# Patient Record
Sex: MALE | Race: WHITE | NOT HISPANIC OR LATINO
[De-identification: names, ages, dates, MRNs, and addresses within clinical notes are randomized per-mention and may not be internally consistent; named-entity substitution may affect disease eponyms.]

---

## 2018-11-08 ENCOUNTER — APPOINTMENT (OUTPATIENT)
Dept: GASTROENTEROLOGY | Facility: CLINIC | Age: 68
End: 2018-11-08
Payer: MEDICARE

## 2018-11-08 VITALS
DIASTOLIC BLOOD PRESSURE: 78 MMHG | SYSTOLIC BLOOD PRESSURE: 148 MMHG | HEART RATE: 59 BPM | BODY MASS INDEX: 28.23 KG/M2 | OXYGEN SATURATION: 98 % | WEIGHT: 220 LBS | HEIGHT: 74 IN

## 2018-11-08 DIAGNOSIS — D12.6 BENIGN NEOPLASM OF COLON, UNSPECIFIED: ICD-10-CM

## 2018-11-08 DIAGNOSIS — Z12.11 ENCOUNTER FOR SCREENING FOR MALIGNANT NEOPLASM OF COLON: ICD-10-CM

## 2018-11-08 PROCEDURE — 99214 OFFICE O/P EST MOD 30 MIN: CPT

## 2018-11-25 ENCOUNTER — TRANSCRIPTION ENCOUNTER (OUTPATIENT)
Age: 68
End: 2018-11-25

## 2018-11-26 ENCOUNTER — RESULT REVIEW (OUTPATIENT)
Age: 68
End: 2018-11-26

## 2018-11-26 ENCOUNTER — OUTPATIENT (OUTPATIENT)
Dept: OUTPATIENT SERVICES | Facility: HOSPITAL | Age: 68
LOS: 1 days | End: 2018-11-26
Payer: MEDICARE

## 2018-11-26 ENCOUNTER — APPOINTMENT (OUTPATIENT)
Dept: GASTROENTEROLOGY | Facility: HOSPITAL | Age: 68
End: 2018-11-26

## 2018-11-26 DIAGNOSIS — Z12.11 ENCOUNTER FOR SCREENING FOR MALIGNANT NEOPLASM OF COLON: ICD-10-CM

## 2018-11-26 PROCEDURE — 88305 TISSUE EXAM BY PATHOLOGIST: CPT

## 2018-11-26 PROCEDURE — 45380 COLONOSCOPY AND BIOPSY: CPT | Mod: PT

## 2018-11-26 PROCEDURE — 45380 COLONOSCOPY AND BIOPSY: CPT

## 2018-11-26 PROCEDURE — 88305 TISSUE EXAM BY PATHOLOGIST: CPT | Mod: 26

## 2018-11-27 LAB — SURGICAL PATHOLOGY FINAL REPORT - CH: SIGNIFICANT CHANGE UP

## 2020-09-17 ENCOUNTER — APPOINTMENT (OUTPATIENT)
Dept: GASTROENTEROLOGY | Facility: CLINIC | Age: 70
End: 2020-09-17
Payer: MEDICARE

## 2020-09-17 VITALS
BODY MASS INDEX: 28.23 KG/M2 | WEIGHT: 220 LBS | OXYGEN SATURATION: 97 % | HEIGHT: 74 IN | HEART RATE: 60 BPM | TEMPERATURE: 98 F | DIASTOLIC BLOOD PRESSURE: 81 MMHG | SYSTOLIC BLOOD PRESSURE: 135 MMHG

## 2020-09-17 DIAGNOSIS — K92.1 MELENA: ICD-10-CM

## 2020-09-17 DIAGNOSIS — D50.9 IRON DEFICIENCY ANEMIA, UNSPECIFIED: ICD-10-CM

## 2020-09-17 PROCEDURE — 99214 OFFICE O/P EST MOD 30 MIN: CPT

## 2020-09-17 RX ORDER — SODIUM SULFATE, POTASSIUM SULFATE, MAGNESIUM SULFATE 17.5; 3.13; 1.6 G/ML; G/ML; G/ML
17.5-3.13-1.6 SOLUTION, CONCENTRATE ORAL
Qty: 1 | Refills: 0 | Status: DISCONTINUED | COMMUNITY
Start: 2018-11-08 | End: 2020-09-17

## 2020-09-17 RX ORDER — SODIUM SULFATE, POTASSIUM SULFATE, MAGNESIUM SULFATE 17.5; 3.13; 1.6 G/ML; G/ML; G/ML
17.5-3.13-1.6 SOLUTION, CONCENTRATE ORAL
Qty: 1 | Refills: 0 | Status: ACTIVE | COMMUNITY
Start: 2020-09-17 | End: 1900-01-01

## 2020-09-17 NOTE — HISTORY OF PRESENT ILLNESS
[de-identified] : The patient moves bowels daily. Denies any abdominal pain, constipation, diarrhea, bright red blood per rectum. Weight is stable.\par \par No heartburn, odynophagia, dysphagia or early satiety.\par \par Recently found to be iron deficient.

## 2020-09-17 NOTE — ASSESSMENT
[FreeTextEntry1] : My impression is that of a male who has a history of colon polyps now with iron deficiency and dark stools.\par \par I have asked the patient to schedule an endoscopy and colonoscopy in the near future. I have reviewed the risks benefits and alternatives and provided the patient literature to read.  I have emphasized the need to have a good clean out including adequate fluid intake and avoiding seeds for one week prior to the procedure.\par \par LSM discussed to achieve weight loss.

## 2020-09-17 NOTE — PHYSICAL EXAM
[General Appearance - Alert] : alert [General Appearance - In No Acute Distress] : in no acute distress [Sclera] : the sclera and conjunctiva were normal [PERRL With Normal Accommodation] : pupils were equal in size, round, and reactive to light [Extraocular Movements] : extraocular movements were intact [Outer Ear] : the ears and nose were normal in appearance [Oropharynx] : the oropharynx was normal [Neck Appearance] : the appearance of the neck was normal [Neck Cervical Mass (___cm)] : no neck mass was observed [Jugular Venous Distention Increased] : there was no jugular-venous distention [Thyroid Diffuse Enlargement] : the thyroid was not enlarged [Thyroid Nodule] : there were no palpable thyroid nodules [Auscultation Breath Sounds / Voice Sounds] : lungs were clear to auscultation bilaterally [Heart Rate And Rhythm] : heart rate was normal and rhythm regular [Heart Sounds] : normal S1 and S2 [Heart Sounds Gallop] : no gallops [Murmurs] : no murmurs [Heart Sounds Pericardial Friction Rub] : no pericardial rub [Edema] : there was no peripheral edema [Bowel Sounds] : normal bowel sounds [Abdomen Soft] : soft [Abdomen Tenderness] : non-tender [Abdomen Mass (___ Cm)] : no abdominal mass palpated [Cervical Lymph Nodes Enlarged Posterior Bilaterally] : posterior cervical [Cervical Lymph Nodes Enlarged Anterior Bilaterally] : anterior cervical [No CVA Tenderness] : no ~M costovertebral angle tenderness [No Spinal Tenderness] : no spinal tenderness [Skin Color & Pigmentation] : normal skin color and pigmentation [Skin Turgor] : normal skin turgor [] : no rash [No Focal Deficits] : no focal deficits [Oriented To Time, Place, And Person] : oriented to person, place, and time [Impaired Insight] : insight and judgment were intact [Affect] : the affect was normal

## 2020-09-21 DIAGNOSIS — Z01.818 ENCOUNTER FOR OTHER PREPROCEDURAL EXAMINATION: ICD-10-CM

## 2020-09-22 ENCOUNTER — APPOINTMENT (OUTPATIENT)
Dept: DISASTER EMERGENCY | Facility: CLINIC | Age: 70
End: 2020-09-22

## 2020-09-23 LAB — SARS-COV-2 N GENE NPH QL NAA+PROBE: NOT DETECTED

## 2020-09-24 ENCOUNTER — TRANSCRIPTION ENCOUNTER (OUTPATIENT)
Age: 70
End: 2020-09-24

## 2020-09-25 ENCOUNTER — APPOINTMENT (OUTPATIENT)
Dept: GASTROENTEROLOGY | Facility: HOSPITAL | Age: 70
End: 2020-09-25

## 2020-09-25 ENCOUNTER — RESULT REVIEW (OUTPATIENT)
Age: 70
End: 2020-09-25

## 2020-09-25 ENCOUNTER — OUTPATIENT (OUTPATIENT)
Dept: OUTPATIENT SERVICES | Facility: HOSPITAL | Age: 70
LOS: 1 days | End: 2020-09-25
Payer: MEDICARE

## 2020-09-25 DIAGNOSIS — D12.6 BENIGN NEOPLASM OF COLON, UNSPECIFIED: ICD-10-CM

## 2020-09-25 DIAGNOSIS — Z12.11 ENCOUNTER FOR SCREENING FOR MALIGNANT NEOPLASM OF COLON: ICD-10-CM

## 2020-09-25 PROCEDURE — 88305 TISSUE EXAM BY PATHOLOGIST: CPT

## 2020-09-25 PROCEDURE — 43239 EGD BIOPSY SINGLE/MULTIPLE: CPT

## 2020-09-25 PROCEDURE — 43239 EGD BIOPSY SINGLE/MULTIPLE: CPT | Mod: 59

## 2020-09-25 PROCEDURE — 88312 SPECIAL STAINS GROUP 1: CPT

## 2020-09-25 PROCEDURE — 88313 SPECIAL STAINS GROUP 2: CPT | Mod: 26

## 2020-09-25 PROCEDURE — 88313 SPECIAL STAINS GROUP 2: CPT

## 2020-09-25 PROCEDURE — 88305 TISSUE EXAM BY PATHOLOGIST: CPT | Mod: 26

## 2020-09-25 PROCEDURE — 88312 SPECIAL STAINS GROUP 1: CPT | Mod: 26

## 2020-09-25 PROCEDURE — G0121: CPT

## 2020-09-25 PROCEDURE — 45378 DIAGNOSTIC COLONOSCOPY: CPT

## 2020-09-28 LAB — SURGICAL PATHOLOGY STUDY: SIGNIFICANT CHANGE UP

## 2021-02-23 ENCOUNTER — EMERGENCY (EMERGENCY)
Facility: HOSPITAL | Age: 71
LOS: 0 days | Discharge: HOME | End: 2021-02-23
Attending: EMERGENCY MEDICINE
Payer: MEDICARE

## 2021-02-23 VITALS
OXYGEN SATURATION: 98 % | SYSTOLIC BLOOD PRESSURE: 151 MMHG | DIASTOLIC BLOOD PRESSURE: 79 MMHG | RESPIRATION RATE: 18 BRPM | HEART RATE: 71 BPM | TEMPERATURE: 99 F

## 2021-02-23 VITALS — WEIGHT: 220.02 LBS | HEIGHT: 73 IN

## 2021-02-23 DIAGNOSIS — F31.9 BIPOLAR DISORDER, UNSPECIFIED: ICD-10-CM

## 2021-02-23 DIAGNOSIS — M25.532 PAIN IN LEFT WRIST: ICD-10-CM
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
## 2021-02-23 DIAGNOSIS — R30.0 DYSURIA: ICD-10-CM

## 2021-02-23 DIAGNOSIS — M47.816 SPONDYLOSIS WITHOUT MYELOPATHY OR RADICULOPATHY, LUMBAR REGION: ICD-10-CM

## 2021-02-23 DIAGNOSIS — M25.552 PAIN IN LEFT HIP: ICD-10-CM

## 2021-02-23 DIAGNOSIS — Z98.84 BARIATRIC SURGERY STATUS: Chronic | ICD-10-CM

## 2021-02-23 DIAGNOSIS — F41.9 ANXIETY DISORDER, UNSPECIFIED: ICD-10-CM
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
## 2021-02-23 DIAGNOSIS — F32.9 MAJOR DEPRESSIVE DISORDER, SINGLE EPISODE, UNSPECIFIED: ICD-10-CM

## 2021-02-23 DIAGNOSIS — R07.9 CHEST PAIN, UNSPECIFIED: ICD-10-CM

## 2021-02-23 DIAGNOSIS — M79.632 PAIN IN LEFT FOREARM: ICD-10-CM

## 2021-02-23 DIAGNOSIS — S70.02XA CONTUSION OF LEFT HIP, INITIAL ENCOUNTER: ICD-10-CM

## 2021-02-23 DIAGNOSIS — R42 DIZZINESS AND GIDDINESS: ICD-10-CM

## 2021-02-23 DIAGNOSIS — Z98.84 BARIATRIC SURGERY STATUS: ICD-10-CM

## 2021-02-23 LAB
APPEARANCE UR: CLEAR — SIGNIFICANT CHANGE UP
BILIRUB UR-MCNC: NEGATIVE — SIGNIFICANT CHANGE UP
COLOR SPEC: YELLOW — SIGNIFICANT CHANGE UP
DIFF PNL FLD: ABNORMAL
GLUCOSE UR QL: NEGATIVE MG/DL — SIGNIFICANT CHANGE UP
KETONES UR-MCNC: NEGATIVE — SIGNIFICANT CHANGE UP
LEUKOCYTE ESTERASE UR-ACNC: ABNORMAL
NITRITE UR-MCNC: NEGATIVE — SIGNIFICANT CHANGE UP
PH UR: 5 — SIGNIFICANT CHANGE UP (ref 5–8)
PROT UR-MCNC: NEGATIVE MG/DL — SIGNIFICANT CHANGE UP
SP GR SPEC: 1.02 — SIGNIFICANT CHANGE UP (ref 1.01–1.02)
UROBILINOGEN FLD QL: NEGATIVE MG/DL — SIGNIFICANT CHANGE UP

## 2021-02-23 PROCEDURE — 99284 EMERGENCY DEPT VISIT MOD MDM: CPT | Mod: 25

## 2021-02-23 PROCEDURE — 73552 X-RAY EXAM OF FEMUR 2/>: CPT | Mod: LT

## 2021-02-23 PROCEDURE — 73110 X-RAY EXAM OF WRIST: CPT | Mod: 26,LT

## 2021-02-23 PROCEDURE — 93005 ELECTROCARDIOGRAM TRACING: CPT

## 2021-02-23 PROCEDURE — 73552 X-RAY EXAM OF FEMUR 2/>: CPT | Mod: 26,LT

## 2021-02-23 PROCEDURE — 73090 X-RAY EXAM OF FOREARM: CPT | Mod: 26,LT

## 2021-02-23 PROCEDURE — 81001 URINALYSIS AUTO W/SCOPE: CPT

## 2021-02-23 PROCEDURE — 71046 X-RAY EXAM CHEST 2 VIEWS: CPT | Mod: 26

## 2021-02-23 PROCEDURE — 73110 X-RAY EXAM OF WRIST: CPT | Mod: LT

## 2021-02-23 PROCEDURE — 93010 ELECTROCARDIOGRAM REPORT: CPT

## 2021-02-23 PROCEDURE — 73502 X-RAY EXAM HIP UNI 2-3 VIEWS: CPT | Mod: 26,LT

## 2021-02-23 PROCEDURE — 87086 URINE CULTURE/COLONY COUNT: CPT

## 2021-02-23 PROCEDURE — 99284 EMERGENCY DEPT VISIT MOD MDM: CPT

## 2021-02-23 PROCEDURE — 71046 X-RAY EXAM CHEST 2 VIEWS: CPT

## 2021-02-23 PROCEDURE — 73090 X-RAY EXAM OF FOREARM: CPT | Mod: LT

## 2021-02-23 PROCEDURE — 73502 X-RAY EXAM HIP UNI 2-3 VIEWS: CPT | Mod: LT

## 2021-02-23 RX ORDER — TRAMADOL HYDROCHLORIDE 50 MG/1
1 TABLET ORAL
Qty: 6 | Refills: 0
Start: 2021-02-23 | End: 2021-02-24

## 2021-02-23 NOTE — ED STATDOCS - PMH
Anemia    Anxiety    Bipolar disorder    Chronic gastritis    MDD (major depressive disorder)    Spondylolysis of lumbar region

## 2021-02-23 NOTE — ED ADULT TRIAGE NOTE - CHIEF COMPLAINT QUOTE
Pt reports that he slipped and fell this am with c/o Left hip discomfort. Tolerates ambulation. Denies LOC. Denies blood thinners. Pt also reports that he had difficulty this am with urination. Pt reports that he has been having some ongoing issues with urination prior to fall.

## 2021-02-23 NOTE — ED STATDOCS - CLINICAL SUMMARY MEDICAL DECISION MAKING FREE TEXT BOX
Pt s/p mechanical slip and fall with L hip, L wrist and L forearm pain. Plan: XRs Pt s/p mechanical slip and fall with L hip, L wrist and L forearm pain. Plan: XRs          Labs, EKG, xrays unremarkable.  Pt. has own urologist to follow with and no pain with last urination.  Pt. to follow with Dr. Zurita.  Laura Dangelo PA-C

## 2021-02-23 NOTE — ED ADULT NURSE NOTE - OBJECTIVE STATEMENT
pt presents to the ED c/o +L hip pain s/p slip and fall on ice this AM. Landed onto L hip and L wrist. After fall states urinating felt +dysuria like "rail road tracks." Has been able to ambulate. No LOC or fever. Not on AC.

## 2021-02-23 NOTE — ED STATDOCS - OBJECTIVE STATEMENT
72 y/o male with a PMHx of anemia, chronic gastritis, spondylolysis of lumbar region, MDD, bipolar disorder, anxiety, s/p bariatric surgery presents to the ED c/o +L hip pain s/p slip and fall on ice this AM. Landed onto L hip and L wrist. After fall states urinating felt +dysuria like "rail road tracks." Has been able to ambulate. No LOC or fever. Not on AC. NKDA.

## 2021-02-23 NOTE — ED STATDOCS - ATTENDING CONTRIBUTION TO CARE
I, Pee Hill MD,  performed the initial face to face bedside interview with this patient regarding history of present illness, review of symptoms and relevant past medical, social and family history.  I completed an independent physical examination.  I was the initial provider who evaluated this patient. I have signed out the follow up of any pending tests (i.e. labs, radiological studies) to the ACP.  I have communicated the patient’s plan of care and disposition with the ACP.  The history, relevant review of systems, past medical and surgical history, medical decision making, and physical examination was documented by the scribe in my presence and I attest to the accuracy of the documentation.

## 2021-02-23 NOTE — ED STATDOCS - CARE PROVIDER_API CALL
Boxer, Jonathan A  INTERNAL MEDICINE  [address] [place] [address]  Phone: [phone]  Fax: [phone]  Follow Up Time:

## 2021-02-23 NOTE — ED STATDOCS - PROGRESS NOTE DETAILS
72 y/o male with a PMHx of anemia, chronic gastritis, spondylolysis of lumbar region, MDD, bipolar disorder, anxiety, s/p bariatric surgery presents to the ED c/o +L hip pain s/p slip and fall on ice this AM. Landed onto L hip and L wrist. After fall states urinating felt +dysuria like "rail road tracks." Has been able to ambulate. No LOC or fever. Not on AC. NKDA.   Laura Dangelo PA-C Plan for xrays, UA.  Pt. ambulatory in ED.  Laura Dangelo PA-C Pt. ambulatory to bathroom with normal gait.  Xrays neg. for acute fx.  Laura Dangelo PA-C Pt. now stating dizziness and CP.  Will add EKG and CXR.  Laura Dangelo PA-C EKG unremarkable.  Laura Dangelo PA-C Labs, EKG, xrays unremarkable.  Pt. has own urologist to follow with and no pain with last urination.  Pt. to follow with Dr. Zurita.  Laura Dangelo PA-C

## 2021-02-23 NOTE — ED STATDOCS - NSFOLLOWUPINSTRUCTIONS_ED_ALL_ED_FT
Contusion in Adults    WHAT YOU NEED TO KNOW:    A contusion is a bruise that appears on your skin after an injury. A bruise happens when small blood vessels tear but skin does not. When blood vessels tear, blood leaks into nearby tissue, such as soft tissue or muscle.    DISCHARGE INSTRUCTIONS:    Return to the emergency department if:     You have new trouble moving the injured area.      You have tingling or numbness in or near the injured area.      Your hand or foot below the bruise gets cold or turns pale.     Contact your healthcare provider if:     You find a new lump in the injured area.      Your symptoms do not improve with treatment after 4 to 5 days.      You have questions or concerns about your condition or care.    Medicines: You may need any of the following:     NSAIDs help decrease swelling and pain or fever. This medicine is available with or without a doctor's order. NSAIDs can cause stomach bleeding or kidney problems in certain people. If you take blood thinner medicine, always ask your healthcare provider if NSAIDs are safe for you. Always read the medicine label and follow directions.      Prescription pain medicine may be given. Do not wait until the pain is severe before you take your medicine.      Take your medicine as directed. Contact your healthcare provider if you think your medicine is not helping or if you have side effects. Tell him of her if you are allergic to any medicine. Keep a list of the medicines, vitamins, and herbs you take. Include the amounts, and when and why you take them. Bring the list or the pill bottles to follow-up visits. Carry your medicine list with you in case of an emergency.    Follow up with your healthcare provider as directed: You may need to return within a week to check your injury again. Write down your questions so you remember to ask them during your visits.    Help a contusion heal:     Rest the injured area or use it less than usual. If you bruised your leg or foot, you may need crutches or a cane to help you walk. This will help you keep weight off your injured body part.       Apply ice to decrease swelling and pain. Ice may also help prevent tissue damage. Use an ice pack, or put crushed ice in a plastic bag. Cover it with a towel and place it on your bruise for 15 to 20 minutes every hour or as directed.      Use compression to support the area and decrease swelling. Wrap an elastic bandage around the area over the bruised muscle. Make sure the bandage is not too tight. You should be able to fit 1 finger between the bandage and your skin.      Elevate (raise) your injured body part above the level of your heart to help decrease pain and swelling. Use pillows, blankets, or rolled towels to elevate the area as often as you can.      Do not drink alcohol as directed. Alcohol may slow healing.      Do not stretch injured muscles right after your injury. Ask your healthcare provider when and how you may safely stretch after your injury. Gentle stretches can help increase your flexibility.      Do not massage the area or put heating pads on the bruise right after your injury. Heat and massage may slow healing. Your healthcare provider may tell you to apply heat after several days. At that time, heat will start to help the injury heal.    Prevent another contusion:     Stretch and warm up before you play sports or exercise.      Wear protective gear when you play sports. Examples are shin guards and padding.       If you begin a new physical activity, start slowly to give your body a chance to adjust.      Dysuria    AMBULATORY CARE:    Dysuria is trouble urinating, or pain, burning, or discomfort when you urinate. Dysuria is usually a symptom of another problem, such as a blockage or urinary tract infection.    Common symptoms include the following:   •Fever       •Cloudy, bad smelling urine       •Urge to urinate often but urinating little       •Back, side, or abdominal pain       •Blood in your urine       •Discharge that smells bad       •Itching       Seek care immediately if:   •You have severe back, side, or abdominal pain.       •You have fever and shaking chills.       •You vomit several times in a row.       Contact your healthcare provider if:   •Your symptoms do not go away, even after treatment.       •You have questions or concerns about your condition or care.       Treatment for dysuria may include medicines to treat a bacterial infection or help decrease bladder spasms.    Manage your dysuria:   •Drink more liquids. Liquids help flush out bacteria that may be causing an infection. Ask your healthcare provider how much liquid to drink each day and which liquids are best for you.       •Take sitz baths as directed. Fill a bathtub with 4 to 6 inches of warm water. You may also use a sitz bath pan that fits over a toilet. Sit in the sitz bath for 20 minutes. Do this 2 to 3 times a day, or as directed. The warm water can help decrease pain and swelling.       Follow up with your healthcare provider as directed: Write down your questions so you remember to ask them during your visits.

## 2021-02-23 NOTE — ED STATDOCS - PATIENT PORTAL LINK FT
You can access the FollowMyHealth Patient Portal offered by [facility] by registering at the following website: [email or web address]. By joining [facility]’s FollowMyHealth portal, you will also be able to view your health information using other applications (apps) compatible with our system.

## 2021-02-23 NOTE — ED STATDOCS - CARE PLAN
Principal Discharge DX:	Contusion of hip  Secondary Diagnosis:	Dysuria   Principal Discharge DX:	Contusion of hip  Secondary Diagnosis:	Dysuria  Secondary Diagnosis:	Fall, initial encounter

## 2021-02-25 LAB
CULTURE RESULTS: SIGNIFICANT CHANGE UP
SPECIMEN SOURCE: SIGNIFICANT CHANGE UP

## 2021-12-05 NOTE — ED STATDOCS - SKIN, MLM
ROS:  GENERAL: No fever, no chills  EYES: no change in vision  HEENT: +nose bleeding.  no trouble swallowing, no trouble speaking  CARDIAC: no chest pain  PULMONARY: no cough, no shortness of breath  GI: no abdominal pain, no nausea, no vomiting, no diarrhea, no constipation  : +L flank pain. No dysuria, no frequency, no change in appearance, or odor of urine  SKIN: no rashes  NEURO: no headache, no weakness  MSK: No joint pain    Michael Palomino DO
+ecchymosis L lateral buttocks

## 2022-10-07 PROBLEM — K29.50 UNSPECIFIED CHRONIC GASTRITIS WITHOUT BLEEDING: Chronic | Status: ACTIVE | Noted: 2021-02-25

## 2022-10-07 PROBLEM — M43.06 SPONDYLOLYSIS, LUMBAR REGION: Chronic | Status: ACTIVE | Noted: 2021-02-25

## 2022-10-07 PROBLEM — F41.9 ANXIETY DISORDER, UNSPECIFIED: Chronic | Status: ACTIVE | Noted: 2021-02-25

## 2022-10-07 PROBLEM — D64.9 ANEMIA, UNSPECIFIED: Chronic | Status: ACTIVE | Noted: 2021-02-25

## 2022-10-07 PROBLEM — F32.9 MAJOR DEPRESSIVE DISORDER, SINGLE EPISODE, UNSPECIFIED: Chronic | Status: ACTIVE | Noted: 2021-02-25

## 2022-10-07 PROBLEM — F31.9 BIPOLAR DISORDER, UNSPECIFIED: Chronic | Status: ACTIVE | Noted: 2021-02-25

## 2022-10-07 NOTE — H&P ADULT - PROBLEM SELECTOR PLAN 1
abnormal CT coronaries  -plan for cardiac cath for ischemic work up  - IVF  cc bolus -Consent obtained for cardiac catheterization w/ coronary angiogram and possible stent placement. Pt is competent, has capacity, and understands risks and benefits of procedure. Risks and benefits discussed. Risk discussed included, but not limited to MI, stroke, mortality, major bleeding, arrythmia, or infection. All questions answered

## 2022-10-07 NOTE — H&P ADULT - HISTORY OF PRESENT ILLNESS
71 y/o M with PMHx of Dementia, HLd, RAJ, Anxiety, ETOH abuse presented to cardiology after a fall while on vacation. Hospitalization and work up done with CT coronary suggestive of cardiac ischemia. Work up completed with cardiac stress test. Referred for cardiac cath  71 y/o M with PMHx of Dementia, HLd, RAJ, Anxiety, ETOH abuse presented to cardiology after a fall while on vacation. Hospitalization and work up done with CT coronary suggestive of cardiac ischemia. Work up completed with cardiac stress test. Arrythmia during stress test. Referred for cardiac cath

## 2022-10-07 NOTE — H&P ADULT - ASSESSMENT
73 y/o M with PMHx of Dementia, HLd, RAJ, Anxiety, ETOH abuse presented to cardiology after a fall while on vacation. Hospitalization and work up done with CT coronary suggestive of cardiac ischemia. Work up completed with cardiac stress test. Referred for cardiac cath     ASA class:  Creatinine:  GFR:  Bleeding  Risk score:  Sunny Score:  71 y/o M with PMHx of Dementia, HLd, RAJ, Anxiety, ETOH abuse presented to cardiology after a fall while on vacation. Hospitalization and work up done with CT coronary suggestive of cardiac ischemia. Work up completed with cardiac stress test. Referred for cardiac cath     ASA class:  Creatinine: 0.7  GFR:110  Bleeding  Risk score: 1.2%  Sunny Score: 1 point

## 2022-10-07 NOTE — H&P ADULT - NSICDXPASTMEDICALHX_GEN_ALL_CORE_FT
PAST MEDICAL HISTORY:  Anemia     Anxiety     Bipolar disorder     Chronic gastritis     MDD (major depressive disorder)     Spondylolysis of lumbar region

## 2022-10-07 NOTE — CHART NOTE - NSCHARTNOTEFT_GEN_A_CORE
Preop Phone Call: 10/7/22 440pm  - Able to Reach Patient:  yes wife   - Info given to:	patient having cardiac catheterization  -  and allergies confirmed: yes  - Medication Information Given: yes  - Medications To Take (specify)	Ok to take a.m. meds w/sip of water  - Medications To Hold (Specify)	vit d ; xanax PRN  - Arrival Time: 0630  - NPO after:	0000  - Understanding of Information Verbalized: yes  will have a  over the age of 18  for d/c home  - Understanding of possible overnight stay if stent is placed: yes

## 2022-10-07 NOTE — H&P ADULT - NSHPLABSRESULTS_GEN_ALL_CORE
Stress test 10/4/22- suggestive of ischemia and evidence of NSVT while exercising Stress test 10/4/22- suggestive of ischemia and evidence of NSVT while exercising  10/11/22 EKG

## 2022-10-11 ENCOUNTER — OUTPATIENT (OUTPATIENT)
Dept: OUTPATIENT SERVICES | Facility: HOSPITAL | Age: 72
LOS: 1 days | Discharge: HOME | End: 2022-10-11
Payer: MEDICARE

## 2022-10-11 VITALS
WEIGHT: 220.02 LBS | SYSTOLIC BLOOD PRESSURE: 172 MMHG | RESPIRATION RATE: 16 BRPM | OXYGEN SATURATION: 100 % | DIASTOLIC BLOOD PRESSURE: 62 MMHG | HEIGHT: 73 IN | TEMPERATURE: 98 F | HEART RATE: 56 BPM

## 2022-10-11 DIAGNOSIS — R06.02 SHORTNESS OF BREATH: ICD-10-CM

## 2022-10-11 DIAGNOSIS — R94.39 ABNORMAL RESULT OF OTHER CARDIOVASCULAR FUNCTION STUDY: ICD-10-CM

## 2022-10-11 DIAGNOSIS — Z98.84 BARIATRIC SURGERY STATUS: Chronic | ICD-10-CM

## 2022-10-11 PROCEDURE — C1894: CPT

## 2022-10-11 PROCEDURE — 80048 BASIC METABOLIC PNL TOTAL CA: CPT

## 2022-10-11 PROCEDURE — C9600: CPT | Mod: LD

## 2022-10-11 PROCEDURE — 93005 ELECTROCARDIOGRAM TRACING: CPT

## 2022-10-11 PROCEDURE — C1725: CPT

## 2022-10-11 PROCEDURE — C1874: CPT

## 2022-10-11 PROCEDURE — 86900 BLOOD TYPING SEROLOGIC ABO: CPT

## 2022-10-11 PROCEDURE — 93010 ELECTROCARDIOGRAM REPORT: CPT | Mod: 76

## 2022-10-11 PROCEDURE — 0715T: CPT

## 2022-10-11 PROCEDURE — C1761: CPT

## 2022-10-11 PROCEDURE — 85025 COMPLETE CBC W/AUTO DIFF WBC: CPT

## 2022-10-11 PROCEDURE — 86901 BLOOD TYPING SEROLOGIC RH(D): CPT

## 2022-10-11 PROCEDURE — C1887: CPT

## 2022-10-11 PROCEDURE — 86850 RBC ANTIBODY SCREEN: CPT

## 2022-10-11 PROCEDURE — C1753: CPT

## 2022-10-11 PROCEDURE — C1769: CPT

## 2022-10-11 PROCEDURE — 36415 COLL VENOUS BLD VENIPUNCTURE: CPT

## 2022-10-11 PROCEDURE — 92978 ENDOLUMINL IVUS OCT C 1ST: CPT | Mod: LD

## 2022-10-11 PROCEDURE — 93458 L HRT ARTERY/VENTRICLE ANGIO: CPT | Mod: 59

## 2022-10-11 PROCEDURE — 86803 HEPATITIS C AB TEST: CPT

## 2022-10-11 RX ORDER — SODIUM CHLORIDE 9 MG/ML
250 INJECTION INTRAMUSCULAR; INTRAVENOUS; SUBCUTANEOUS ONCE
Refills: 0 | Status: COMPLETED | OUTPATIENT
Start: 2022-10-11 | End: 2022-10-11

## 2022-10-11 RX ORDER — ALPRAZOLAM 0.25 MG
1 TABLET ORAL EVERY 6 HOURS
Refills: 0 | Status: DISCONTINUED | OUTPATIENT
Start: 2022-10-11 | End: 2022-10-11

## 2022-10-11 RX ORDER — LAMOTRIGINE 25 MG/1
300 TABLET, ORALLY DISINTEGRATING ORAL DAILY
Refills: 0 | Status: DISCONTINUED | OUTPATIENT
Start: 2022-10-11 | End: 2022-10-11

## 2022-10-11 RX ORDER — LAMOTRIGINE 25 MG/1
150 TABLET, ORALLY DISINTEGRATING ORAL ONCE
Refills: 0 | Status: DISCONTINUED | OUTPATIENT
Start: 2022-10-11 | End: 2022-10-11

## 2022-10-11 RX ORDER — CLOPIDOGREL BISULFATE 75 MG/1
1 TABLET, FILM COATED ORAL
Qty: 30 | Refills: 11
Start: 2022-10-11 | End: 2023-10-05

## 2022-10-11 RX ORDER — METOPROLOL TARTRATE 50 MG
25 TABLET ORAL ONCE
Refills: 0 | Status: COMPLETED | OUTPATIENT
Start: 2022-10-12 | End: 2022-10-12

## 2022-10-11 RX ORDER — ASPIRIN/CALCIUM CARB/MAGNESIUM 324 MG
0 TABLET ORAL
Qty: 0 | Refills: 0 | DISCHARGE

## 2022-10-11 RX ORDER — LAMOTRIGINE 25 MG/1
0 TABLET, ORALLY DISINTEGRATING ORAL
Qty: 0 | Refills: 0 | DISCHARGE

## 2022-10-11 RX ORDER — ASPIRIN/CALCIUM CARB/MAGNESIUM 324 MG
81 TABLET ORAL DAILY
Refills: 0 | Status: DISCONTINUED | OUTPATIENT
Start: 2022-10-12 | End: 2022-10-11

## 2022-10-11 RX ORDER — LAMOTRIGINE 25 MG/1
50 TABLET, ORALLY DISINTEGRATING ORAL DAILY
Refills: 0 | Status: DISCONTINUED | OUTPATIENT
Start: 2022-10-11 | End: 2022-10-11

## 2022-10-11 RX ORDER — SODIUM CHLORIDE 9 MG/ML
1000 INJECTION INTRAMUSCULAR; INTRAVENOUS; SUBCUTANEOUS
Refills: 0 | Status: DISCONTINUED | OUTPATIENT
Start: 2022-10-11 | End: 2022-10-11

## 2022-10-11 RX ORDER — ACETAMINOPHEN 500 MG
650 TABLET ORAL EVERY 6 HOURS
Refills: 0 | Status: DISCONTINUED | OUTPATIENT
Start: 2022-10-11 | End: 2022-10-11

## 2022-10-11 RX ORDER — ATORVASTATIN CALCIUM 80 MG/1
1 TABLET, FILM COATED ORAL
Qty: 30 | Refills: 3
Start: 2022-10-11 | End: 2023-02-07

## 2022-10-11 RX ORDER — ATORVASTATIN CALCIUM 80 MG/1
10 TABLET, FILM COATED ORAL ONCE
Refills: 0 | Status: DISCONTINUED | OUTPATIENT
Start: 2022-10-11 | End: 2022-10-11

## 2022-10-11 RX ORDER — CLOPIDOGREL BISULFATE 75 MG/1
75 TABLET, FILM COATED ORAL DAILY
Refills: 0 | Status: DISCONTINUED | OUTPATIENT
Start: 2022-10-12 | End: 2022-10-11

## 2022-10-11 RX ADMIN — Medication 1 MILLIGRAM(S): at 17:20

## 2022-10-11 RX ADMIN — SODIUM CHLORIDE 200 MILLILITER(S): 9 INJECTION INTRAMUSCULAR; INTRAVENOUS; SUBCUTANEOUS at 10:23

## 2022-10-11 RX ADMIN — Medication 1 MILLIGRAM(S): at 23:02

## 2022-10-11 RX ADMIN — Medication 650 MILLIGRAM(S): at 13:20

## 2022-10-11 RX ADMIN — Medication 650 MILLIGRAM(S): at 22:36

## 2022-10-11 RX ADMIN — SODIUM CHLORIDE 250 MILLILITER(S): 9 INJECTION INTRAMUSCULAR; INTRAVENOUS; SUBCUTANEOUS at 07:37

## 2022-10-11 NOTE — ASU PATIENT PROFILE, ADULT - VISION (WITH CORRECTIVE LENSES IF THE PATIENT USUALLY WEARS THEM):
readers at bedside/Partially impaired: cannot see medication labels or newsprint, but can see obstacles in path, and the surrounding layout; can count fingers at arm's length

## 2022-10-11 NOTE — CHART NOTE - NSCHARTNOTEFT_GEN_A_CORE
71 y/o M with PMHx of Dementia, HLd, RAJ, Anxiety, ETOH abuse presented to cardiology after a fall while on vacation. Hospitalization and work up done with CT coronary suggestive of cardiac ischemia. Work up completed with cardiac stress test. Arrythmia during stress test. Referred for cardiac cath  (07 Oct 2022 13:42)  s/p LHC : DELMI x 2 to Prox LAD with shockwave/IVUS. denies CP, SOB, palpitation  Right radial site procedure dsg dry and intact.  No bleeding, no hematoma. Capillary refill < 2 seconds. Palpable radial pulse.

## 2022-10-12 ENCOUNTER — TRANSCRIPTION ENCOUNTER (OUTPATIENT)
Age: 72
End: 2022-10-12

## 2022-10-12 VITALS — TEMPERATURE: 98 F

## 2022-10-12 LAB
ANION GAP SERPL CALC-SCNC: 4 MMOL/L — LOW (ref 5–17)
BASOPHILS # BLD AUTO: 0.06 K/UL — SIGNIFICANT CHANGE UP (ref 0–0.2)
BASOPHILS NFR BLD AUTO: 0.7 % — SIGNIFICANT CHANGE UP (ref 0–2)
BUN SERPL-MCNC: 9 MG/DL — SIGNIFICANT CHANGE UP (ref 7–23)
CALCIUM SERPL-MCNC: 8.7 MG/DL — SIGNIFICANT CHANGE UP (ref 8.5–10.1)
CHLORIDE SERPL-SCNC: 111 MMOL/L — HIGH (ref 96–108)
CO2 SERPL-SCNC: 27 MMOL/L — SIGNIFICANT CHANGE UP (ref 22–31)
CREAT SERPL-MCNC: 0.72 MG/DL — SIGNIFICANT CHANGE UP (ref 0.5–1.3)
EGFR: 96 ML/MIN/1.73M2 — SIGNIFICANT CHANGE UP
EOSINOPHIL # BLD AUTO: 0.14 K/UL — SIGNIFICANT CHANGE UP (ref 0–0.5)
EOSINOPHIL NFR BLD AUTO: 1.5 % — SIGNIFICANT CHANGE UP (ref 0–6)
GLUCOSE SERPL-MCNC: 98 MG/DL — SIGNIFICANT CHANGE UP (ref 70–99)
HCT VFR BLD CALC: 41.9 % — SIGNIFICANT CHANGE UP (ref 39–50)
HCV AB S/CO SERPL IA: 0.13 S/CO — SIGNIFICANT CHANGE UP (ref 0–0.99)
HCV AB SERPL-IMP: SIGNIFICANT CHANGE UP
HGB BLD-MCNC: 14.1 G/DL — SIGNIFICANT CHANGE UP (ref 13–17)
IMM GRANULOCYTES NFR BLD AUTO: 0.2 % — SIGNIFICANT CHANGE UP (ref 0–0.9)
LYMPHOCYTES # BLD AUTO: 2.14 K/UL — SIGNIFICANT CHANGE UP (ref 1–3.3)
LYMPHOCYTES # BLD AUTO: 23.6 % — SIGNIFICANT CHANGE UP (ref 13–44)
MCHC RBC-ENTMCNC: 32 PG — SIGNIFICANT CHANGE UP (ref 27–34)
MCHC RBC-ENTMCNC: 33.7 GM/DL — SIGNIFICANT CHANGE UP (ref 32–36)
MCV RBC AUTO: 95 FL — SIGNIFICANT CHANGE UP (ref 80–100)
MONOCYTES # BLD AUTO: 0.66 K/UL — SIGNIFICANT CHANGE UP (ref 0–0.9)
MONOCYTES NFR BLD AUTO: 7.3 % — SIGNIFICANT CHANGE UP (ref 2–14)
NEUTROPHILS # BLD AUTO: 6.05 K/UL — SIGNIFICANT CHANGE UP (ref 1.8–7.4)
NEUTROPHILS NFR BLD AUTO: 66.7 % — SIGNIFICANT CHANGE UP (ref 43–77)
PLATELET # BLD AUTO: 210 K/UL — SIGNIFICANT CHANGE UP (ref 150–400)
POTASSIUM SERPL-MCNC: 3.8 MMOL/L — SIGNIFICANT CHANGE UP (ref 3.5–5.3)
POTASSIUM SERPL-SCNC: 3.8 MMOL/L — SIGNIFICANT CHANGE UP (ref 3.5–5.3)
RBC # BLD: 4.41 M/UL — SIGNIFICANT CHANGE UP (ref 4.2–5.8)
RBC # FLD: 12.6 % — SIGNIFICANT CHANGE UP (ref 10.3–14.5)
SODIUM SERPL-SCNC: 142 MMOL/L — SIGNIFICANT CHANGE UP (ref 135–145)
WBC # BLD: 9.07 K/UL — SIGNIFICANT CHANGE UP (ref 3.8–10.5)
WBC # FLD AUTO: 9.07 K/UL — SIGNIFICANT CHANGE UP (ref 3.8–10.5)

## 2022-10-12 PROCEDURE — 93010 ELECTROCARDIOGRAM REPORT: CPT

## 2022-10-12 RX ORDER — ASPIRIN/CALCIUM CARB/MAGNESIUM 324 MG
1 TABLET ORAL
Qty: 0 | Refills: 0 | DISCHARGE

## 2022-10-12 RX ORDER — BUPROPION HYDROCHLORIDE 150 MG/1
1 TABLET, EXTENDED RELEASE ORAL
Qty: 0 | Refills: 0 | DISCHARGE

## 2022-10-12 RX ORDER — ALPRAZOLAM 0.25 MG
0 TABLET ORAL
Qty: 0 | Refills: 0 | DISCHARGE

## 2022-10-12 RX ORDER — LAMOTRIGINE 25 MG/1
350 TABLET, ORALLY DISINTEGRATING ORAL
Qty: 0 | Refills: 0 | DISCHARGE

## 2022-10-12 RX ORDER — ERGOCALCIFEROL 1.25 MG/1
50000 CAPSULE ORAL
Qty: 0 | Refills: 0 | DISCHARGE

## 2022-10-12 RX ORDER — BUPROPION HYDROCHLORIDE 150 MG/1
0 TABLET, EXTENDED RELEASE ORAL
Qty: 0 | Refills: 0 | DISCHARGE

## 2022-10-12 RX ORDER — METOPROLOL TARTRATE 50 MG
1 TABLET ORAL
Qty: 0 | Refills: 0 | DISCHARGE

## 2022-10-12 RX ORDER — ALPRAZOLAM 0.25 MG
1 TABLET ORAL
Qty: 0 | Refills: 0 | DISCHARGE

## 2022-10-12 RX ADMIN — LAMOTRIGINE 50 MILLIGRAM(S): 25 TABLET, ORALLY DISINTEGRATING ORAL at 09:09

## 2022-10-12 RX ADMIN — Medication 25 MILLIGRAM(S): at 09:08

## 2022-10-12 RX ADMIN — LAMOTRIGINE 300 MILLIGRAM(S): 25 TABLET, ORALLY DISINTEGRATING ORAL at 09:10

## 2022-10-12 RX ADMIN — Medication 81 MILLIGRAM(S): at 09:08

## 2022-10-12 RX ADMIN — CLOPIDOGREL BISULFATE 75 MILLIGRAM(S): 75 TABLET, FILM COATED ORAL at 09:10

## 2022-10-12 RX ADMIN — Medication 1 MILLIGRAM(S): at 07:35

## 2022-10-12 NOTE — DISCHARGE NOTE NURSING/CASE MANAGEMENT/SOCIAL WORK - PATIENT PORTAL LINK FT
You can access the FollowMyHealth Patient Portal offered by [facility] by registering at the following website: [email or web address]. By joining [employer]’s FollowMyHealth portal, you will also be able to view your health information using other applications (apps) compatible with our system.

## 2022-10-12 NOTE — DISCHARGE NOTE PROVIDER - NSDCCPCAREPLAN_GEN_ALL_CORE_FT
PRINCIPAL DISCHARGE DIAGNOSIS  Diagnosis: CAD (coronary artery disease)  Assessment and Plan of Treatment: Aspirin, Plavix, lipitor

## 2022-10-12 NOTE — DISCHARGE NOTE NURSING/CASE MANAGEMENT/SOCIAL WORK - NSTRANSFERBELONGINGSRESP_GEN_A_NUR
Abilify 10 mg oral tablet: 1 tab(s) orally once a day  aspirin 81 mg oral tablet, chewable: 1 tab(s) orally once a day  docusate sodium 10 mg/mL oral liquid: 10 milliliter(s) orally 2 times a day  Lexapro 10 mg oral tablet: 1 tab(s) orally once a day  lisinopril 10 mg oral tablet: 1 tab(s) orally once a day  oxyCODONE 15 mg oral tablet: 1 tab(s) orally every 6 hours  Xanax 1 mg oral tablet: 1 tab(s) orally once a day (at bedtime)  zolpidem 5 mg oral tablet: 1 tab(s) orally once a day (at bedtime)  
yes

## 2022-10-12 NOTE — DISCHARGE NOTE PROVIDER - NSDCCPTREATMENT_GEN_ALL_CORE_FT
PRINCIPAL PROCEDURE  Procedure: Percutaneous coronary intervention, with stent insertion  Findings and Treatment: LAD stent

## 2022-10-12 NOTE — DISCHARGE NOTE PROVIDER - HOSPITAL COURSE
73 y/o M with PMHx of Dementia, HLd, RAJ, Anxiety, ETOH abuse presented to cardiology after a fall while on vacation. Hospitalization and work up done with CT coronary suggestive of cardiac ischemia. Work up completed with cardiac stress test. Arrythmia during stress test. Referred for cardiac cath  (07 Oct 2022 13:42)    s/p LHC : DELMI x 2 to Prox LAD with shockwave/IVUS  Pt denies chest pain/SOB/palpitations overnight. Stable for discharge                        14.1   9.07  )-----------( 210      ( 12 Oct 2022 06:05 )             41.9     10-12 @ 06:05    142 | 111 | 9  /8.7 | -- | --  _______________________/  3.8 | 27 | 0.72 8                          \par     < from: Cardiac Catheterization (10.11.22 @ 08:30) >    Cardiac Arteries and Lesion Findings    LMCA: Diffuse irregularity.There is mild diffuse disease noted.    LAD: Diffuse irregularity.     Prox LAD: Proximal subsection.90% stenosis 28 mm length reduced to 0%.Pre   procedure TEENA III flow was noted. Good runoff was present.The lesion was   diagnosed as a moderate risk lesion.The lesion was tubularand heavily   calcified.The lesion showed moderate angulation and mild tortuosity.     Post Procedure TEENA III flow was present.     Treatment results:Interventional treatment was successful.     Comments:IVUS showed the vessel diameter to be 4.3 mm distally and 5.1 mm   proximally.    < end of copied text >     71 y/o M with PMHx of Dementia, HLd, RAJ, Anxiety, ETOH abuse presented to cardiology after a fall while on vacation. Hospitalization and work up done with CT coronary suggestive of cardiac ischemia. Work up completed with cardiac stress test. Arrythmia during stress test. Referred for cardiac cath  (07 Oct 2022 13:42)    s/p LHC : DELMI x 2 to Prox LAD with shockwave/IVUS  Pt denies chest pain/SOB/palpitations overnight. Stable for discharge                        14.1   9.07  )-----------( 210      ( 12 Oct 2022 06:05 )             41.9     10-12 @ 06:05    142 | 111 | 9  /8.7 | -- | --  _______________________/  3.8 | 27 | 0.72 8                          \par     < from: Cardiac Catheterization (10.11.22 @ 08:30) >    Cardiac Arteries and Lesion Findings    LMCA: Diffuse irregularity.There is mild diffuse disease noted.    LAD: Diffuse irregularity.     Prox LAD: Proximal subsection.90% stenosis 28 mm length reduced to 0%.Pre   procedure TEENA III flow was noted. Good runoff was present.The lesion was   diagnosed as a moderate risk lesion.The lesion was tubularand heavily   calcified.The lesion showed moderate angulation and mild tortuosity.     Post Procedure TEENA III flow was present.     Treatment results:Interventional treatment was successful.     Comments:IVUS showed the vessel diameter to be 4.3 mm distally and 5.1 mm   proximally.    < end of copied text >    I was present for this E&M service and discussed care with patient and NP.

## 2022-10-12 NOTE — DISCHARGE NOTE PROVIDER - NSDCMRMEDTOKEN_GEN_ALL_CORE_FT
Aspirin Enteric Coated 81 mg oral delayed release tablet: 1 tab(s) orally once a day  clopidogrel 75 mg oral tablet: 1 tab(s) orally once a day   LaMICtal: 350 milligram(s) orally once a day  Lipitor 10 mg oral tablet: 1 tab(s) orally once a day (at bedtime)   Toprol-XL 25 mg oral tablet, extended release: 1 tab(s) orally once a day  Vitamin D2: 59540 unit(s) orally  Wellbutrin SR:   Xanax 1 mg oral tablet: 1 tab(s) orally once a day, As Needed   Aspirin Enteric Coated 81 mg oral delayed release tablet: 1 tab(s) orally once a day  clopidogrel 75 mg oral tablet: 1 tab(s) orally once a day   LaMICtal: 350 milligram(s) orally once a day  Lipitor 10 mg oral tablet: 1 tab(s) orally once a day (at bedtime)   Toprol-XL 25 mg oral tablet, extended release: 1 tab(s) orally once a day  Vitamin D2: 79621 unit(s) orally  Wellbutrin 100 mg oral tablet: 1 tab(s) orally once a day  Xanax 1 mg oral tablet: 1 tab(s) orally once a day, As Needed

## 2022-10-12 NOTE — DISCHARGE NOTE NURSING/CASE MANAGEMENT/SOCIAL WORK - NSDCPEFALRISK_GEN_ALL_CORE
For information on Fall & Injury Prevention, visit: [email or web address] OR  [email or web address] OR  https://www.cdc.gov/steadi/patient.html

## 2022-10-12 NOTE — PROGRESS NOTE ADULT - SUBJECTIVE AND OBJECTIVE BOX
Nurse Practitioner Progress note:   72 year old presented for elective cardiac cath on 10/11. s/p PCI Denies chest pain, shortness of breath, dizziness or palpitations. OOB to chair     PHYSICAL EXAM:  Constitutional: NAD,  Neuro: Alert and oriented x 3 Gait steady Speech clear No focal deficits  Neck: No JVD No bruit  Respiratory: CTAB  Cardiovascular: S1 and S2, RRR,   Gastrointestinal: BS+, soft, NT/ND  Extremities: No clubbing cyanosis or edema No varicosities  Vascular: 2+ peripheral pulses  Psychiatric: Normal mood, normal affect  Musculoskeletal: 5/5 strength b/l upper and lower extremities  Right radial dsg removed.  Procedure site CDI, no bleeding, no hematoma, able to move all digits with capillary refill <2 seconds, fingers warm    T(C): 37.1 (10-12-22 @ 06:23), Max: 37.1 (10-12-22 @ 06:23)  HR: 60 (10-12-22 @ 09:00) (47 - 62)  BP: 138/48 (10-12-22 @ 09:00) (129/64 - 171/76)  RR: 18 (10-12-22 @ 09:00) (14 - 25)  SpO2: 98% (10-12-22 @ 09:00) (91% - 98%)  Tele: NSR  EKG: NSR-no ischemic changes    CBC Full  -  ( 12 Oct 2022 06:05 )  WBC Count : 9.07 K/uL  RBC Count : 4.41 M/uL  Hemoglobin : 14.1 g/dL  Hematocrit : 41.9 %  Platelet Count - Automated : 210 K/uL  Mean Cell Volume : 95.0 fl  Mean Cell Hemoglobin : 32.0 pg  Mean Cell Hemoglobin Concentration : 33.7 gm/dL  Auto Neutrophil # : 6.05 K/uL  Auto Lymphocyte # : 2.14 K/uL  Auto Monocyte # : 0.66 K/uL  Auto Eosinophil # : 0.14 K/uL  Auto Basophil # : 0.06 K/uL  Auto Neutrophil % : 66.7 %  Auto Lymphocyte % : 23.6 %  Auto Monocyte % : 7.3 %  Auto Eosinophil % : 1.5 %  Auto Basophil % : 0.7 %    10-12    142  |  111<H>  |  9   ----------------------------<  98  3.8   |  27  |  0.72    Ca    8.7      12 Oct 2022 06:05        HPI:  73 y/o M with PMHx of Dementia, HLd, RAJ, Anxiety, ETOH abuse presented to cardiology after a fall while on vacation. Hospitalization and work up done with CT coronary suggestive of cardiac ischemia. Work up completed with cardiac stress test. Arrythmia during stress test. Referred for cardiac cath  (07 Oct 2022 13:42)    s/p PCI DELMI x 2 to Prox LAD with shockwave/IVUS      ASSESSMENT/PLAN:    -continue ASA b-blocker  -Plavix 75mg daily Rx  -Begin Lipitor 10mg HS Rx  --plan discussed with patient and family  -Discussed therapeutic lifestyle changes to reduce risk factors such as following a cardiac diet, weight loss, maintaining a healthy weight, exercise, smoking cessation, medication compliance, and regular follow-up  with PCP/Cardioloigst  
Cath Lab Nurse Practitioner Note  HPI:  73 y/o M with PMHx of Dementia, HLd, RAJ, Anxiety, ETOH abuse presented to cardiology after a fall while on vacation. Hospitalization and work up done with CT coronary suggestive of cardiac ischemia. Work up completed with cardiac stress test. Arrythmia during stress test. Referred for cardiac cath  (07 Oct 2022 13:42)    s/p LHC : MYKE x 2 to Prox LAD with shockwave/IVUS  Pt denies chest pain/SOB/palpitations post cath.      Vital Signs Last 24 Hrs  T(C): 36.8 (11 Oct 2022 07:21), Max: 36.8 (11 Oct 2022 07:17)  T(F): 98.2 (11 Oct 2022 07:21), Max: 98.2 (11 Oct 2022 07:17)  HR: 52 (11 Oct 2022 10:25) (51 - 56)  BP: 171/73 (11 Oct 2022 10:25) (167/77 - 175/75)  RR: 16 (11 Oct 2022 10:25) (16 - 16)  SpO2: 98% (11 Oct 2022 10:25) (98% - 100%)    Parameters below as of 11 Oct 2022 10:40  Patient On (Oxygen Delivery Method): room air        MEDICATIONS  (STANDING):  atorvastatin 10 milliGRAM(s) Oral Once  lamoTRIgine 300 milliGRAM(s) Oral daily  lamoTRIgine 50 milliGRAM(s) Oral daily  sodium chloride 0.9%. 1000 milliLiter(s) (200 mL/Hr) IV Continuous <Continuous>      PHYSICAL EXAM  GENERAL: NAD, AAOx3  HEAD:  Atraumatic, Normocephalic  EYES: EOMI, PERRLA, conjunctiva and sclera clear  NECK: Supple, No JVD, No LAD  CHEST/LUNG: Clear to auscultation bilaterally; No wheeze  HEART: s1 s2 Regular rate and rhythm; No murmurs, rubs, or gallops  ABDOMEN: Soft, Nontender, Nondistended; Bowel sounds present X 4 quadrants   EXTREMITIES:  2+ Peripheral Pulses, No clubbing, cyanosis, or edema  SKIN: No rashes or lesions,  b/l LE not red, cool to touch,, no open skin no drainage  NEURO: nonfocal CN/motor/sensory/reflexes  Psych: normal affect and behavior, calm and cooperative   PROCEDURE SITE: RRA band removed two hours post procedure ,Site is without hematoma or bleeding. Sensation and AR intact. Distal pulses palpable 2+, capillary refill < 2 seconds. Patient denies pain, numbness, tingling, CP or SOB. Clean dry dressing applied     EKG: Post PCI 10/11/22  Sinus Jared 1st degree AV block  Rate 52      PROCEDURE RESULTS: Full report to follow     ASSESSMENT : HPI:  73 y/o M with PMHx of Dementia, HLd, RAJ, Anxiety, ETOH abuse presented to cardiology after a fall while on vacation. Hospitalization and work up done with CT coronary suggestive of cardiac ischemia. Work up completed with cardiac stress test. Arrythmia during stress test. Referred for cardiac cath  (07 Oct 2022 13:42)    PLAN:  CAD, s/p 2 myke to prox LAD   Admit to CCU/CICU  -IV hydration NS @ 200cc/hr x 4hrs   VS, lab, diet and activity per PCI post orders  -continue ASAb-blocker  -begin Plavix 75mg daily 10/12/22  -Begin Lipitor 10mg HS 10/11/22  -f/u EKG in AM, AM Labs  -plan discussed with patient, family and Dr Thornton   -Discussed therapeutic lifestyle changes to reduce risk factors such as following a cardiac diet, weight loss, maintaining a healthy weight, exercise, smoking cessation, medication compliance, and regular follow-up  with PCP/Cardioloigst  --Post cath instructions reviewed, patient verbalizes and understands instructions

## 2022-10-12 NOTE — DISCHARGE NOTE PROVIDER - CARE PROVIDER_API CALL
Michael Thornton (MD)  Cardiovascular Disease  [address] [place] [address]  Phone: [phone]  Fax: [phone]  Follow Up Time: 2 weeks

## 2022-10-13 DIAGNOSIS — I25.118 ATHEROSCLEROTIC HEART DISEASE OF NATIVE CORONARY ARTERY WITH OTHER FORMS OF ANGINA PECTORIS: ICD-10-CM

## 2022-10-13 DIAGNOSIS — R94.39 ABNORMAL RESULT OF OTHER CARDIOVASCULAR FUNCTION STUDY: ICD-10-CM

## 2022-10-13 DIAGNOSIS — R55 SYNCOPE AND COLLAPSE: ICD-10-CM

## 2022-12-13 ENCOUNTER — NON-APPOINTMENT (OUTPATIENT)
Age: 72
End: 2022-12-13

## 2022-12-13 DIAGNOSIS — M25.562 PAIN IN LEFT KNEE: ICD-10-CM
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
## 2022-12-13 DIAGNOSIS — Z86.79 PERSONAL HISTORY OF OTHER DISEASES OF THE CIRCULATORY SYSTEM: ICD-10-CM

## 2022-12-13 RX ORDER — BUPROPION HYDROCHLORIDE 100 MG/1
100 TABLET, FILM COATED ORAL DAILY
Refills: 0 | Status: ACTIVE | COMMUNITY

## 2023-02-27 ENCOUNTER — APPOINTMENT (OUTPATIENT)
Dept: ORTHOPEDIC SURGERY | Facility: CLINIC | Age: 73
End: 2023-02-27
Payer: MEDICARE

## 2023-02-27 VITALS — BODY MASS INDEX: 28.23 KG/M2 | HEIGHT: 74 IN | WEIGHT: 220 LBS

## 2023-02-27 PROCEDURE — 20610 DRAIN/INJ JOINT/BURSA W/O US: CPT | Mod: LT

## 2023-02-27 PROCEDURE — J3490M: CUSTOM | Mod: NC

## 2023-02-27 PROCEDURE — 99213 OFFICE O/P EST LOW 20 MIN: CPT | Mod: 25

## 2023-02-27 RX ORDER — LAMOTRIGINE 150 MG/1
150 TABLET ORAL DAILY
Refills: 0 | Status: DISCONTINUED | COMMUNITY
End: 2023-02-27

## 2023-03-01 NOTE — ASSESSMENT
[FreeTextEntry1] : Patient comes in today for follow-up on his left knee.  He continues have pain consistent with his osteoarthritis.  He has trouble walking distances doing stairs and occasionally at night.  I gave him a cortisone injection about a year ago which seems to have helped.  He is here for another injection.

## 2023-03-01 NOTE — IMAGING
[de-identified] : Examination left lower extremity reveals normal neurovascular exam.  Examination of the left knee reveals limited range of motion from 5 to 125 degrees with slight valgus deformity.  There is mild lateral joint line pain with no Dominik's sign instability or effusion.  There is no redness rashes or visible scars.  Examination of his left hip is normal with full painless range of motion.

## 2023-03-01 NOTE — HISTORY OF PRESENT ILLNESS
[8] : 8 [Sharp] : sharp [Intermittent] : intermittent [Massage] : massage [Walking] : walking [1] : 1 [Steroid] : Steroid [] : no [FreeTextEntry1] : Lt. Knee [FreeTextEntry5] : 72 y/o M presents for f/u eval. of Lt. knee and CSI. Last tx - CSI 12/13/2021 with good relief. [FreeTextEntry9] : Voltaren Gel [de-identified] : 12/13/2021 [de-identified] : CSI [de-identified] : 12/13/2021 [de-identified] : Lt. Knee

## 2023-07-27 ENCOUNTER — APPOINTMENT (OUTPATIENT)
Dept: ORTHOPEDIC SURGERY | Facility: CLINIC | Age: 73
End: 2023-07-27
Payer: MEDICARE

## 2023-07-27 VITALS — WEIGHT: 220 LBS | HEIGHT: 73 IN | BODY MASS INDEX: 29.16 KG/M2

## 2023-07-27 PROCEDURE — 99214 OFFICE O/P EST MOD 30 MIN: CPT | Mod: 25

## 2023-07-27 PROCEDURE — 20610 DRAIN/INJ JOINT/BURSA W/O US: CPT | Mod: 50

## 2023-07-27 PROCEDURE — 73502 X-RAY EXAM HIP UNI 2-3 VIEWS: CPT

## 2023-07-28 NOTE — ASSESSMENT
[FreeTextEntry1] : Patient comes in today for follow-up on his left knee and new injuries to bilateral hips. He reports no new trauma to the hips. He has pain to the lateral hips at the trochanters on either side. Right is worse than left. He denies any groin pain. He has pain at night when laying directly onto the hip. He denies pain with walking, getting in the car, or putting shoes on. \par He continues have pain consistent with his osteoarthritis.  He has trouble walking distances doing stairs and occasionally at night.  I gave him a cortisone injection about a year ago which seems to have helped.  He is here for another injection.\par \par Left knee exam: Neurovascularly intact. Sensation intact.  Examination of the left knee reveals limited range of motion from 5 to 125 degrees with slight valgus deformity.  There is mild lateral joint line pain with no Dominik's sign instability or effusion.  There is no redness rashes or visible scars.  Examination of his left hip is normal with full painless range of motion.\par \par Bilateral hip exam:  Neurovascularly intact. Sensation intact to right/left lower extremity. No scars, cuts or abrasions. 2+ pulses to posterior tibialis. ROM is full and painless. + abductor tenderness. - groin pain. + lateral hip tenderness. - radiculopathy. + straight leg raise. 5/5 abductor strength with soreness. - pain with forced ER/IR.\par \par Bilateral hip xrays taken today in office, 1view AP pelvis and 2 views hip WB - No hip OA noted. No fractures or loose bodies. No obvious tumors, masses, or lesions. \par \par The patient has b/l trochanteric bursitis and left knee OA. He received bilateral trochanteric cortisone injections today. He tolerated them well. He will return for a left knee CSI.

## 2023-07-28 NOTE — HISTORY OF PRESENT ILLNESS
[9] : 9 [Sharp] : sharp [Constant] : constant [Massage] : massage [] : no [FreeTextEntry1] : B/L hips & Lt. Knee [FreeTextEntry5] : 72 y/o M presents for f/u eval. of Lt. Knee and NI eval. of B/L hips. Pt reports of chronic hip pain for the past 6-8 months with no associated trauma. He notes difficulty sleeping in bed. Previous tx CSI Lt knee with good relief.  [FreeTextEntry9] : Voltaren [de-identified] : 2/27/23 [de-identified] : Dr. Humphreys [de-identified] : CSI

## 2023-07-28 NOTE — PROCEDURE
[Large Joint Injection] : Large joint injection [Bilateral] : bilaterally of the [Greater Trochanteric Bursa] : greater trochanteric bursa [Pain] : pain [X-ray evidence of Osteoarthritis on this or prior visit] : x-ray evidence of Osteoarthritis on this or prior visit [Betadine] : betadine [Ethyl Chloride sprayed topically] : ethyl chloride sprayed topically [Sterile technique used] : sterile technique used [___ cc    1%] : Lidocaine ~Vcc of 1%  [___ cc    0.25%] : Bupivacaine (Marcaine) ~Vcc of 0.25%  [___ cc    40mg] : Triamcinolone (Kenalog) ~Vcc of 40 mg  [] : Patient tolerated procedure well [Call if redness, pain or fever occur] : call if redness, pain or fever occur [Previous OTC use and PT nontherapeutic] : patient has tried OTC's including aspirin, Ibuprofen, Aleve, etc or prescription NSAIDS, and/or exercises at home and/or physical therapy without satisfactory response [Patient had decreased mobility in the joint] : patient had decreased mobility in the joint [Risks, benefits, alternatives discussed / Verbal consent obtained] : the risks benefits, and alternatives have been discussed, and verbal consent was obtained

## 2023-08-03 ENCOUNTER — APPOINTMENT (OUTPATIENT)
Dept: ORTHOPEDIC SURGERY | Facility: CLINIC | Age: 73
End: 2023-08-03
Payer: MEDICARE

## 2023-08-03 VITALS — WEIGHT: 220 LBS | HEIGHT: 73 IN | BODY MASS INDEX: 29.16 KG/M2

## 2023-08-03 DIAGNOSIS — M17.12 UNILATERAL PRIMARY OSTEOARTHRITIS, LEFT KNEE: ICD-10-CM

## 2023-08-03 DIAGNOSIS — M70.61 TROCHANTERIC BURSITIS, RIGHT HIP: ICD-10-CM

## 2023-08-03 DIAGNOSIS — M70.62 TROCHANTERIC BURSITIS, LEFT HIP: ICD-10-CM

## 2023-08-03 PROCEDURE — 20611 DRAIN/INJ JOINT/BURSA W/US: CPT | Mod: LT

## 2023-08-03 PROCEDURE — 99213 OFFICE O/P EST LOW 20 MIN: CPT | Mod: 25

## 2023-08-03 NOTE — HISTORY OF PRESENT ILLNESS
[Sharp] : sharp [Constant] : constant [Massage] : massage [8] : 8 [] : no [FreeTextEntry1] : Lt. Knee [FreeTextEntry5] : 74 y/o M presents for CSI Lt. Knee today. Pt reports pain levels have decreased slightly since last visit.  [FreeTextEntry9] : Voltaren [de-identified] : 7/27/23 [de-identified] : Dr. Humphreys

## 2023-08-03 NOTE — ASSESSMENT
[FreeTextEntry1] : The patient is here for a left knee CSI as previously discussed. His b/l hip bursal injections have worked well so far. He reports no new trauma to the hips. He has pain to the lateral hips at the trochanters on either side. Right is worse than left. He denies any groin pain. He has pain at night when laying directly onto the hip. He denies pain with walking, getting in the car, or putting shoes on.  He continues have pain consistent with his osteoarthritis.  He has trouble walking distances doing stairs and occasionally at night.  I gave him a cortisone injection about a year ago which seems to have helped.  He is here for another injection.  Left knee exam: Neurovascularly intact. Sensation intact.  Examination of the left knee reveals limited range of motion from 5 to 125 degrees with slight valgus deformity.  There is mild lateral joint line pain with no Dominik's sign instability or effusion.  There is no redness rashes or visible scars.  Examination of his left hip is normal with full painless range of motion.  The patient received a left knee CSI under ultrasound guidance. He tolerated it well. He will follow up as needed.

## 2023-11-09 ENCOUNTER — APPOINTMENT (OUTPATIENT)
Dept: ORTHOPEDIC SURGERY | Facility: CLINIC | Age: 73
End: 2023-11-09
Payer: MEDICARE

## 2023-11-09 VITALS — WEIGHT: 210 LBS | HEIGHT: 73 IN | BODY MASS INDEX: 27.83 KG/M2

## 2023-11-09 DIAGNOSIS — M25.519 PAIN IN UNSPECIFIED SHOULDER: ICD-10-CM

## 2023-11-09 PROCEDURE — 73030 X-RAY EXAM OF SHOULDER: CPT | Mod: RT

## 2023-11-09 PROCEDURE — 73010 X-RAY EXAM OF SHOULDER BLADE: CPT | Mod: RT

## 2023-11-09 PROCEDURE — 99213 OFFICE O/P EST LOW 20 MIN: CPT | Mod: 25

## 2023-11-09 PROCEDURE — 20610 DRAIN/INJ JOINT/BURSA W/O US: CPT | Mod: RT

## 2024-02-15 ENCOUNTER — APPOINTMENT (OUTPATIENT)
Dept: ORTHOPEDIC SURGERY | Facility: CLINIC | Age: 74
End: 2024-02-15
Payer: MEDICARE

## 2024-02-15 VITALS — BODY MASS INDEX: 27.83 KG/M2 | HEIGHT: 73 IN | WEIGHT: 210 LBS

## 2024-02-15 DIAGNOSIS — M75.81 OTHER SHOULDER LESIONS, RIGHT SHOULDER: ICD-10-CM

## 2024-02-15 PROCEDURE — 99213 OFFICE O/P EST LOW 20 MIN: CPT | Mod: 25

## 2024-02-15 PROCEDURE — 20610 DRAIN/INJ JOINT/BURSA W/O US: CPT | Mod: RT

## 2024-02-17 PROBLEM — M75.81 TENDINITIS OF RIGHT ROTATOR CUFF: Status: ACTIVE | Noted: 2023-11-11

## 2024-02-17 NOTE — HISTORY OF PRESENT ILLNESS
[9] : 9 [Throbbing] : throbbing [Tingling] : tingling [] : yes [Sleep] : sleep [Massage] : massage [Injection therapy] : injection therapy [Lying in bed] : lying in bed [FreeTextEntry5] : 72 y/o M presents for CSI Rt. shldr today.  [FreeTextEntry7] : down Rt. arm [FreeTextEntry9] : Voltaren

## 2024-02-17 NOTE — ASSESSMENT
[FreeTextEntry1] : The patient is here for right shoulder follow up. He had excellent relief from his previous CSI. He wishes to consider this again. He has had pain increase over the past few weeks. He reports trying to put away lawn furniture and as he went to pick a piece up, had increased pain to the anterolateral shoulder. He now has pain with reaching overhead and away from the body. He denies paresthesias. The patient has weakness with overhead activity. He has pain with ADLs involving WB.   Right shoulder exam: The patient presents with no apparent distress. Neurovascularly intact. Sensation intact to right upper extremity. No scars, rashes, or abrasions. 2+ pulses to the distal radius.Tender to palpation at anterolateral shoulder. AROM  ABD 90 ER 55 IR L1. 5-/5 RC strength. 5/5 Deltoid strength. +Neer. +Calvillo.   The patient received a right shoulder subacromial CSI. He tolerated it well. He will call for MRI if needed.

## 2024-02-17 NOTE — PROCEDURE
[Large Joint Injection] : Large joint injection [Right] : of the right [Subacromial Space] : subacromial space [Pain] : pain [Inflammation] : inflammation [Repeat series performed] : repeat series performed [Alcohol] : alcohol [Betadine] : betadine [Ethyl Chloride sprayed topically] : ethyl chloride sprayed topically [Sterile technique used] : sterile technique used [___ cc    1%] : Lidocaine ~Vcc of 1%  [___ cc    0.25%] : Bupivacaine (Marcaine) ~Vcc of 0.25%  [___ cc    40mg] : Triamcinolone (Kenalog) ~Vcc of 40 mg  [] : Patient tolerated procedure well [Call if redness, pain or fever occur] : call if redness, pain or fever occur [Previous OTC use and PT nontherapeutic] : patient has tried OTC's including aspirin, Ibuprofen, Aleve, etc or prescription NSAIDS, and/or exercises at home and/or physical therapy without satisfactory response [Patient had decreased mobility in the joint] : patient had decreased mobility in the joint [Risks, benefits, alternatives discussed / Verbal consent obtained] : the risks benefits, and alternatives have been discussed, and verbal consent was obtained

## 2025-01-21 ENCOUNTER — APPOINTMENT (OUTPATIENT)
Facility: CLINIC | Age: 75
End: 2025-01-21
Payer: MEDICARE

## 2025-01-21 DIAGNOSIS — M70.62 TROCHANTERIC BURSITIS, LEFT HIP: ICD-10-CM

## 2025-01-21 DIAGNOSIS — M17.12 UNILATERAL PRIMARY OSTEOARTHRITIS, LEFT KNEE: ICD-10-CM

## 2025-01-21 PROCEDURE — 99213 OFFICE O/P EST LOW 20 MIN: CPT | Mod: 25

## 2025-01-21 PROCEDURE — 73564 X-RAY EXAM KNEE 4 OR MORE: CPT | Mod: LT

## 2025-01-21 PROCEDURE — 73502 X-RAY EXAM HIP UNI 2-3 VIEWS: CPT

## 2025-01-21 PROCEDURE — 20610 DRAIN/INJ JOINT/BURSA W/O US: CPT | Mod: LT

## 2025-06-19 ENCOUNTER — APPOINTMENT (OUTPATIENT)
Dept: ORTHOPEDIC SURGERY | Facility: CLINIC | Age: 75
End: 2025-06-19
Payer: MEDICARE

## 2025-06-19 VITALS — BODY MASS INDEX: 26.51 KG/M2 | HEIGHT: 73 IN | WEIGHT: 200 LBS

## 2025-06-19 PROBLEM — M76.899 HAMSTRING TENDINITIS AT ORIGIN: Status: ACTIVE | Noted: 2025-06-19

## 2025-06-19 PROCEDURE — 99214 OFFICE O/P EST MOD 30 MIN: CPT | Mod: 25

## 2025-06-19 PROCEDURE — 99204 OFFICE O/P NEW MOD 45 MIN: CPT | Mod: 25

## 2025-06-19 PROCEDURE — 73503 X-RAY EXAM HIP UNI 4/> VIEWS: CPT | Mod: RT

## 2025-06-19 PROCEDURE — J3490M: CUSTOM | Mod: NC

## 2025-06-19 PROCEDURE — 20611 DRAIN/INJ JOINT/BURSA W/US: CPT | Mod: RT

## 2025-07-28 ENCOUNTER — APPOINTMENT (OUTPATIENT)
Dept: ORTHOPEDIC SURGERY | Facility: CLINIC | Age: 75
End: 2025-07-28
Payer: MEDICARE

## 2025-07-28 VITALS — BODY MASS INDEX: 26.51 KG/M2 | WEIGHT: 200 LBS | HEIGHT: 73 IN

## 2025-07-28 DIAGNOSIS — Z00.00 ENCOUNTER FOR GENERAL ADULT MEDICAL EXAMINATION W/OUT ABNORMAL FINDINGS: ICD-10-CM

## 2025-07-28 DIAGNOSIS — M65.342 TRIGGER FINGER, LEFT RING FINGER: ICD-10-CM

## 2025-07-28 DIAGNOSIS — M65.332 TRIGGER FINGER, LEFT MIDDLE FINGER: ICD-10-CM

## 2025-07-28 PROCEDURE — L3908: CPT | Mod: LT

## 2025-07-28 PROCEDURE — 20550 NJX 1 TENDON SHEATH/LIGAMENT: CPT | Mod: F3

## 2025-07-28 PROCEDURE — 99213 OFFICE O/P EST LOW 20 MIN: CPT | Mod: 25

## 2025-07-28 PROCEDURE — 73110 X-RAY EXAM OF WRIST: CPT | Mod: LT

## 2025-07-28 PROCEDURE — J3490M: CUSTOM | Mod: NC,JZ

## 2025-07-31 ENCOUNTER — APPOINTMENT (OUTPATIENT)
Dept: ORTHOPEDIC SURGERY | Facility: CLINIC | Age: 75
End: 2025-07-31
Payer: MEDICARE

## 2025-07-31 VITALS — HEIGHT: 73 IN | WEIGHT: 200 LBS | BODY MASS INDEX: 26.51 KG/M2

## 2025-07-31 DIAGNOSIS — G57.01 LESION OF SCIATIC NERVE, RIGHT LOWER LIMB: ICD-10-CM

## 2025-07-31 DIAGNOSIS — M70.71 OTHER BURSITIS OF HIP, RIGHT HIP: ICD-10-CM

## 2025-07-31 PROCEDURE — 99213 OFFICE O/P EST LOW 20 MIN: CPT

## 2025-08-07 ENCOUNTER — APPOINTMENT (OUTPATIENT)
Dept: GASTROENTEROLOGY | Facility: CLINIC | Age: 75
End: 2025-08-07
Payer: MEDICARE

## 2025-08-07 VITALS
SYSTOLIC BLOOD PRESSURE: 140 MMHG | WEIGHT: 199 LBS | HEART RATE: 80 BPM | HEIGHT: 73 IN | DIASTOLIC BLOOD PRESSURE: 87 MMHG | TEMPERATURE: 98.5 F | RESPIRATION RATE: 16 BRPM | BODY MASS INDEX: 26.37 KG/M2 | OXYGEN SATURATION: 97 %

## 2025-08-07 DIAGNOSIS — Z86.0101 PERSONAL HISTORY OF ADENOMATOUS AND SERRATED COLON POLYPS: ICD-10-CM

## 2025-08-07 DIAGNOSIS — K57.30 DIVERTICULOSIS OF LARGE INTESTINE W/OUT PERFORATION OR ABSCESS W/OUT BLEEDING: ICD-10-CM

## 2025-08-07 PROCEDURE — 99204 OFFICE O/P NEW MOD 45 MIN: CPT

## 2025-08-07 RX ORDER — LAMOTRIGINE 200 MG/1
200 TABLET ORAL
Refills: 0 | Status: ACTIVE | COMMUNITY

## 2025-08-07 RX ORDER — METOPROLOL TARTRATE 75 MG/1
TABLET, FILM COATED ORAL
Refills: 0 | Status: ACTIVE | COMMUNITY

## 2025-08-07 RX ORDER — ALPRAZOLAM 2 MG/1
TABLET ORAL
Refills: 0 | Status: ACTIVE | COMMUNITY

## 2025-08-07 RX ORDER — ATORVASTATIN CALCIUM 80 MG/1
TABLET, FILM COATED ORAL
Refills: 0 | Status: ACTIVE | COMMUNITY

## 2025-08-07 RX ORDER — ASPIRIN 81 MG/1
81 TABLET, COATED ORAL
Refills: 0 | Status: ACTIVE | COMMUNITY

## 2025-08-07 RX ORDER — CLOPIDOGREL 75 MG/1
TABLET, FILM COATED ORAL
Refills: 0 | Status: ACTIVE | COMMUNITY

## 2025-08-11 ENCOUNTER — APPOINTMENT (OUTPATIENT)
Dept: ORTHOPEDIC SURGERY | Facility: CLINIC | Age: 75
End: 2025-08-11
Payer: MEDICARE

## 2025-08-11 VITALS — BODY MASS INDEX: 26.37 KG/M2 | HEIGHT: 73 IN | WEIGHT: 199 LBS

## 2025-08-11 DIAGNOSIS — S52.532A COLLES' FRACTURE OF LEFT RADIUS, INITIAL ENCOUNTER FOR CLOSED FRACTURE: ICD-10-CM

## 2025-08-11 PROCEDURE — 99213 OFFICE O/P EST LOW 20 MIN: CPT

## 2025-08-11 PROCEDURE — 73110 X-RAY EXAM OF WRIST: CPT | Mod: LT

## 2025-08-28 ENCOUNTER — APPOINTMENT (OUTPATIENT)
Dept: ORTHOPEDIC SURGERY | Facility: CLINIC | Age: 75
End: 2025-08-28